# Patient Record
Sex: MALE | Race: WHITE | NOT HISPANIC OR LATINO | Employment: FULL TIME | URBAN - METROPOLITAN AREA
[De-identification: names, ages, dates, MRNs, and addresses within clinical notes are randomized per-mention and may not be internally consistent; named-entity substitution may affect disease eponyms.]

---

## 2019-09-16 ENCOUNTER — HISTORICAL (OUTPATIENT)
Dept: ADMINISTRATIVE | Facility: HOSPITAL | Age: 39
End: 2019-09-16

## 2019-09-16 LAB
ABS NEUT (OLG): 5.6 X10(3)/MCL (ref 2.1–9.2)
ALBUMIN SERPL-MCNC: 4.6 GM/DL (ref 3.4–5)
ALBUMIN/GLOB SERPL: 1.92 {RATIO} (ref 1.5–2.5)
ALP SERPL-CCNC: 64 UNIT/L (ref 38–126)
ALT SERPL-CCNC: 22 UNIT/L (ref 7–52)
APPEARANCE, UA: CLEAR
AST SERPL-CCNC: 23 UNIT/L (ref 15–37)
BACTERIA #/AREA URNS AUTO: NORMAL /HPF
BILIRUB SERPL-MCNC: 0.6 MG/DL (ref 0.2–1)
BILIRUB UR QL STRIP: NEGATIVE MG/DL
BILIRUBIN DIRECT+TOT PNL SERPL-MCNC: 0.1 MG/DL (ref 0–0.5)
BILIRUBIN DIRECT+TOT PNL SERPL-MCNC: 0.5 MG/DL
BUN SERPL-MCNC: 15 MG/DL (ref 7–18)
CALCIUM SERPL-MCNC: 9.6 MG/DL (ref 8.5–10)
CHLORIDE SERPL-SCNC: 102 MMOL/L (ref 98–107)
CHOLEST SERPL-MCNC: 189 MG/DL (ref 0–200)
CHOLEST/HDLC SERPL: 3.6 {RATIO}
CO2 SERPL-SCNC: 25 MMOL/L (ref 21–32)
COLOR UR: YELLOW
CREAT SERPL-MCNC: 0.97 MG/DL (ref 0.6–1.3)
ERYTHROCYTE [DISTWIDTH] IN BLOOD BY AUTOMATED COUNT: 12.5 % (ref 11.5–17)
GLOBULIN SER-MCNC: 2.4 GM/DL (ref 1.2–3)
GLUCOSE (UA): NEGATIVE MG/DL
GLUCOSE SERPL-MCNC: 105 MG/DL (ref 74–106)
HCT VFR BLD AUTO: 42.2 % (ref 42–52)
HDLC SERPL-MCNC: 53 MG/DL (ref 35–60)
HGB BLD-MCNC: 14.5 GM/DL (ref 14–18)
HGB UR QL STRIP: NEGATIVE UNIT/L
KETONES UR QL STRIP: NEGATIVE MG/DL
LDLC SERPL CALC-MCNC: 126 MG/DL (ref 0–129)
LEUKOCYTE ESTERASE UR QL STRIP: NEGATIVE UNIT/L
LYMPHOCYTES # BLD AUTO: 3.2 X10(3)/MCL (ref 0.6–3.4)
LYMPHOCYTES NFR BLD AUTO: 33 % (ref 13–40)
MCH RBC QN AUTO: 30.5 PG (ref 27–31.2)
MCHC RBC AUTO-ENTMCNC: 34 GM/DL (ref 32–36)
MCV RBC AUTO: 89 FL (ref 80–94)
MONOCYTES # BLD AUTO: 0.9 X10(3)/MCL (ref 0.1–1.3)
MONOCYTES NFR BLD AUTO: 9.1 % (ref 0.1–24)
NEUTROPHILS NFR BLD AUTO: 57.9 % (ref 47–80)
NITRITE UR QL STRIP.AUTO: NEGATIVE
PH UR STRIP: 6.5 [PH]
PLATELET # BLD AUTO: 285 X10(3)/MCL (ref 130–400)
PMV BLD AUTO: 10.4 FL (ref 9.4–12.4)
POTASSIUM SERPL-SCNC: 4.6 MMOL/L (ref 3.5–5.1)
PROT SERPL-MCNC: 7 GM/DL (ref 6.4–8.2)
PROT UR QL STRIP: NEGATIVE MG/DL
RBC # BLD AUTO: 4.75 X10(6)/MCL (ref 4.7–6.1)
RBC #/AREA URNS HPF: NORMAL /HPF
SODIUM SERPL-SCNC: 138 MMOL/L (ref 136–145)
SP GR UR STRIP: 1.02
SQUAMOUS EPITHELIAL, UA: NORMAL /LPF
TRIGL SERPL-MCNC: 211 MG/DL (ref 30–150)
UROBILINOGEN UR STRIP-ACNC: 1 MG/DL
VLDLC SERPL CALC-MCNC: 42.2 MG/DL
WBC # SPEC AUTO: 9.7 X10(3)/MCL (ref 4.5–11.5)
WBC #/AREA URNS AUTO: NORMAL /[HPF]

## 2020-05-05 ENCOUNTER — HISTORICAL (OUTPATIENT)
Dept: LAB | Facility: HOSPITAL | Age: 40
End: 2020-05-05

## 2020-09-22 ENCOUNTER — HISTORICAL (OUTPATIENT)
Dept: ADMINISTRATIVE | Facility: HOSPITAL | Age: 40
End: 2020-09-22

## 2020-09-22 LAB
ABS NEUT (OLG): 5.9 X10(3)/MCL (ref 2.1–9.2)
ALBUMIN SERPL-MCNC: 4.6 GM/DL (ref 3.4–5)
ALBUMIN/GLOB SERPL: 2.09 {RATIO} (ref 1.5–2.5)
ALP SERPL-CCNC: 66 UNIT/L (ref 38–126)
ALT SERPL-CCNC: 24 UNIT/L (ref 7–52)
APPEARANCE, UA: CLEAR
AST SERPL-CCNC: 17 UNIT/L (ref 15–37)
BACTERIA #/AREA URNS AUTO: NORMAL /HPF
BILIRUB SERPL-MCNC: 1 MG/DL (ref 0.2–1)
BILIRUB UR QL STRIP: NEGATIVE MG/DL
BILIRUBIN DIRECT+TOT PNL SERPL-MCNC: 0.2 MG/DL (ref 0–0.5)
BILIRUBIN DIRECT+TOT PNL SERPL-MCNC: 0.8 MG/DL
BUN SERPL-MCNC: 16 MG/DL (ref 7–18)
CALCIUM SERPL-MCNC: 9.8 MG/DL (ref 8.5–10.1)
CHLORIDE SERPL-SCNC: 103 MMOL/L (ref 98–107)
CHOLEST SERPL-MCNC: 190 MG/DL (ref 0–200)
CHOLEST/HDLC SERPL: 3.6 {RATIO}
CO2 SERPL-SCNC: 27 MMOL/L (ref 21–32)
COLOR UR: YELLOW
CREAT SERPL-MCNC: 1.13 MG/DL (ref 0.6–1.3)
ERYTHROCYTE [DISTWIDTH] IN BLOOD BY AUTOMATED COUNT: 13.1 % (ref 11.5–17)
GLOBULIN SER-MCNC: 2.2 GM/DL (ref 1.2–3)
GLUCOSE (UA): NEGATIVE MG/DL
GLUCOSE SERPL-MCNC: 80 MG/DL (ref 74–106)
HCT VFR BLD AUTO: 45.1 % (ref 42–52)
HDLC SERPL-MCNC: 53 MG/DL (ref 35–60)
HGB BLD-MCNC: 15.2 GM/DL (ref 14–18)
HGB UR QL STRIP: NEGATIVE UNIT/L
KETONES UR QL STRIP: NEGATIVE MG/DL
LDLC SERPL CALC-MCNC: 131 MG/DL (ref 0–129)
LEUKOCYTE ESTERASE UR QL STRIP: NEGATIVE UNIT/L
LYMPHOCYTES # BLD AUTO: 3.3 X10(3)/MCL (ref 0.6–3.4)
LYMPHOCYTES NFR BLD AUTO: 33.9 % (ref 13–40)
MCH RBC QN AUTO: 30.5 PG (ref 27–31.2)
MCHC RBC AUTO-ENTMCNC: 34 GM/DL (ref 32–36)
MCV RBC AUTO: 91 FL (ref 80–94)
MONOCYTES # BLD AUTO: 0.5 X10(3)/MCL (ref 0.1–1.3)
MONOCYTES NFR BLD AUTO: 5.5 % (ref 0.1–24)
NEUTROPHILS NFR BLD AUTO: 60.6 % (ref 47–80)
NITRITE UR QL STRIP.AUTO: NEGATIVE
PH UR STRIP: 6 [PH]
PLATELET # BLD AUTO: 326 X10(3)/MCL (ref 130–400)
PMV BLD AUTO: 10.1 FL (ref 9.4–12.4)
POTASSIUM SERPL-SCNC: 4.5 MMOL/L (ref 3.5–5.1)
PROT SERPL-MCNC: 6.8 GM/DL (ref 6.4–8.2)
PROT UR QL STRIP: NEGATIVE MG/DL
PSA SERPL-MCNC: 0.55 NG/ML (ref 0–2.5)
RBC # BLD AUTO: 4.98 X10(6)/MCL (ref 4.7–6.1)
RBC #/AREA URNS HPF: NORMAL /HPF
SODIUM SERPL-SCNC: 139 MMOL/L (ref 136–145)
SP GR UR STRIP: 1.02
SQUAMOUS EPITHELIAL, UA: NORMAL /LPF
TRIGL SERPL-MCNC: 133 MG/DL (ref 30–150)
UROBILINOGEN UR STRIP-ACNC: 0.2 MG/DL
VLDLC SERPL CALC-MCNC: 26.6 MG/DL
WBC # SPEC AUTO: 9.7 X10(3)/MCL (ref 4.5–11.5)
WBC #/AREA URNS AUTO: NORMAL /[HPF]

## 2020-10-29 ENCOUNTER — HISTORICAL (OUTPATIENT)
Dept: RADIOLOGY | Facility: HOSPITAL | Age: 40
End: 2020-10-29

## 2021-09-29 ENCOUNTER — HISTORICAL (OUTPATIENT)
Dept: ADMINISTRATIVE | Facility: HOSPITAL | Age: 41
End: 2021-09-29

## 2021-09-29 LAB
ABS NEUT (OLG): 6.1 X10(3)/MCL (ref 2.1–9.2)
ALBUMIN SERPL-MCNC: 4.8 GM/DL (ref 3.4–5)
ALBUMIN/GLOB SERPL: 2 {RATIO} (ref 1.5–2.5)
ALP SERPL-CCNC: 81 UNIT/L (ref 38–126)
ALT SERPL-CCNC: 17 UNIT/L (ref 7–52)
APPEARANCE, UA: CLEAR
AST SERPL-CCNC: 15 UNIT/L (ref 15–37)
BACTERIA #/AREA URNS AUTO: NORMAL /HPF
BILIRUB SERPL-MCNC: 1 MG/DL (ref 0.2–1)
BILIRUB UR QL STRIP: NEGATIVE MG/DL
BILIRUBIN DIRECT+TOT PNL SERPL-MCNC: 0.2 MG/DL (ref 0–0.5)
BILIRUBIN DIRECT+TOT PNL SERPL-MCNC: 0.8 MG/DL
BUN SERPL-MCNC: 14 MG/DL (ref 7–18)
CALCIUM SERPL-MCNC: 10 MG/DL (ref 8.5–10.1)
CHLORIDE SERPL-SCNC: 101 MMOL/L (ref 98–107)
CHOLEST SERPL-MCNC: 204 MG/DL (ref 0–200)
CHOLEST/HDLC SERPL: 4.4 {RATIO}
CO2 SERPL-SCNC: 30 MMOL/L (ref 21–32)
COLOR UR: YELLOW
CREAT SERPL-MCNC: 1.09 MG/DL (ref 0.6–1.3)
ERYTHROCYTE [DISTWIDTH] IN BLOOD BY AUTOMATED COUNT: 12.2 % (ref 11.5–17)
GLOBULIN SER-MCNC: 2.4 GM/DL (ref 1.2–3)
GLUCOSE (UA): NEGATIVE MG/DL
GLUCOSE SERPL-MCNC: 91 MG/DL (ref 74–106)
HCT VFR BLD AUTO: 44 % (ref 42–52)
HDLC SERPL-MCNC: 46 MG/DL (ref 35–60)
HGB BLD-MCNC: 14.9 GM/DL (ref 14–18)
HGB UR QL STRIP: NEGATIVE UNIT/L
KETONES UR QL STRIP: NEGATIVE MG/DL
LDLC SERPL CALC-MCNC: 117 MG/DL (ref 0–129)
LEUKOCYTE ESTERASE UR QL STRIP: NEGATIVE UNIT/L
LYMPHOCYTES # BLD AUTO: 2.8 X10(3)/MCL (ref 0.6–3.4)
LYMPHOCYTES NFR BLD AUTO: 29.6 % (ref 13–40)
MCH RBC QN AUTO: 30.6 PG (ref 27–31.2)
MCHC RBC AUTO-ENTMCNC: 34 GM/DL (ref 32–36)
MCV RBC AUTO: 90 FL (ref 80–94)
MONOCYTES # BLD AUTO: 0.5 X10(3)/MCL (ref 0.1–1.3)
MONOCYTES NFR BLD AUTO: 5.6 % (ref 0.1–24)
NEUTROPHILS NFR BLD AUTO: 64.8 % (ref 47–80)
NITRITE UR QL STRIP.AUTO: NEGATIVE
PH UR STRIP: 7 [PH]
PLATELET # BLD AUTO: 334 X10(3)/MCL (ref 130–400)
PMV BLD AUTO: 10.1 FL (ref 9.4–12.4)
POTASSIUM SERPL-SCNC: 4.3 MMOL/L (ref 3.5–5.1)
PROT SERPL-MCNC: 7.2 GM/DL (ref 6.4–8.2)
PROT UR QL STRIP: NEGATIVE MG/DL
PSA SERPL-MCNC: 0.61 NG/ML (ref 0–2.5)
RBC # BLD AUTO: 4.87 X10(6)/MCL (ref 4.7–6.1)
RBC #/AREA URNS HPF: NORMAL /HPF
SODIUM SERPL-SCNC: 140 MMOL/L (ref 136–145)
SP GR UR STRIP: 1.02
SQUAMOUS EPITHELIAL, UA: NORMAL /LPF
TRIGL SERPL-MCNC: 240 MG/DL (ref 30–150)
UROBILINOGEN UR STRIP-ACNC: 0.2 MG/DL
VLDLC SERPL CALC-MCNC: 48 MG/DL
WBC # SPEC AUTO: 9.4 X10(3)/MCL (ref 4.5–11.5)
WBC #/AREA URNS AUTO: NORMAL /[HPF]

## 2021-12-31 LAB
INFLUENZA A ANTIGEN, POC: NEGATIVE
INFLUENZA B ANTIGEN, POC: NEGATIVE
SARS-COV-2 RNA RESP QL NAA+PROBE: NEGATIVE

## 2022-04-12 ENCOUNTER — HISTORICAL (OUTPATIENT)
Dept: ADMINISTRATIVE | Facility: HOSPITAL | Age: 42
End: 2022-04-12

## 2022-04-30 VITALS
BODY MASS INDEX: 32.87 KG/M2 | DIASTOLIC BLOOD PRESSURE: 95 MMHG | OXYGEN SATURATION: 96 % | HEIGHT: 67 IN | WEIGHT: 209.44 LBS | SYSTOLIC BLOOD PRESSURE: 136 MMHG

## 2022-09-22 ENCOUNTER — HISTORICAL (OUTPATIENT)
Dept: ADMINISTRATIVE | Facility: HOSPITAL | Age: 42
End: 2022-09-22

## 2022-09-29 PROBLEM — Z00.00 ENCOUNTER FOR WELLNESS EXAMINATION IN ADULT: Status: ACTIVE | Noted: 2022-09-29

## 2022-09-29 PROBLEM — Z23 IMMUNIZATION DUE: Status: ACTIVE | Noted: 2022-09-29

## 2022-09-29 PROBLEM — Z12.5 PROSTATE CANCER SCREENING: Status: ACTIVE | Noted: 2022-09-29

## 2022-09-29 PROBLEM — I10 HYPERTENSION: Status: ACTIVE | Noted: 2022-09-29

## 2022-09-29 PROBLEM — I10 PRIMARY HYPERTENSION: Status: RESOLVED | Noted: 2022-09-29 | Resolved: 2022-09-29

## 2022-09-29 PROBLEM — I10 PRIMARY HYPERTENSION: Status: ACTIVE | Noted: 2022-09-29

## 2022-09-29 PROBLEM — M54.12 CERVICAL RADICULOPATHY: Status: ACTIVE | Noted: 2022-09-29

## 2022-11-01 ENCOUNTER — PATIENT MESSAGE (OUTPATIENT)
Dept: ADMINISTRATIVE | Facility: OTHER | Age: 42
End: 2022-11-01
Payer: COMMERCIAL

## 2022-11-02 ENCOUNTER — OFFICE VISIT (OUTPATIENT)
Dept: URGENT CARE | Facility: CLINIC | Age: 42
End: 2022-11-02
Payer: COMMERCIAL

## 2022-11-02 VITALS
WEIGHT: 205 LBS | HEIGHT: 67 IN | RESPIRATION RATE: 18 BRPM | TEMPERATURE: 99 F | DIASTOLIC BLOOD PRESSURE: 87 MMHG | OXYGEN SATURATION: 99 % | BODY MASS INDEX: 32.18 KG/M2 | HEART RATE: 65 BPM | SYSTOLIC BLOOD PRESSURE: 132 MMHG

## 2022-11-02 DIAGNOSIS — M54.41 RIGHT-SIDED LOW BACK PAIN WITH RIGHT-SIDED SCIATICA, UNSPECIFIED CHRONICITY: Primary | ICD-10-CM

## 2022-11-02 PROCEDURE — 1160F RVW MEDS BY RX/DR IN RCRD: CPT | Mod: CPTII,,,

## 2022-11-02 PROCEDURE — 3075F PR MOST RECENT SYSTOLIC BLOOD PRESS GE 130-139MM HG: ICD-10-PCS | Mod: CPTII,,,

## 2022-11-02 PROCEDURE — 99213 OFFICE O/P EST LOW 20 MIN: CPT | Mod: 25,,,

## 2022-11-02 PROCEDURE — 3075F SYST BP GE 130 - 139MM HG: CPT | Mod: CPTII,,,

## 2022-11-02 PROCEDURE — 1160F PR REVIEW ALL MEDS BY PRESCRIBER/CLIN PHARMACIST DOCUMENTED: ICD-10-PCS | Mod: CPTII,,,

## 2022-11-02 PROCEDURE — 1159F MED LIST DOCD IN RCRD: CPT | Mod: CPTII,,,

## 2022-11-02 PROCEDURE — 1159F PR MEDICATION LIST DOCUMENTED IN MEDICAL RECORD: ICD-10-PCS | Mod: CPTII,,,

## 2022-11-02 PROCEDURE — 96372 PR INJECTION,THERAP/PROPH/DIAG2ST, IM OR SUBCUT: ICD-10-PCS | Mod: ,,,

## 2022-11-02 PROCEDURE — 4010F ACE/ARB THERAPY RXD/TAKEN: CPT | Mod: CPTII,,,

## 2022-11-02 PROCEDURE — 4010F PR ACE/ARB THEARPY RXD/TAKEN: ICD-10-PCS | Mod: CPTII,,,

## 2022-11-02 PROCEDURE — 3079F PR MOST RECENT DIASTOLIC BLOOD PRESSURE 80-89 MM HG: ICD-10-PCS | Mod: CPTII,,,

## 2022-11-02 PROCEDURE — 3008F BODY MASS INDEX DOCD: CPT | Mod: CPTII,,,

## 2022-11-02 PROCEDURE — 96372 THER/PROPH/DIAG INJ SC/IM: CPT | Mod: ,,,

## 2022-11-02 PROCEDURE — 3008F PR BODY MASS INDEX (BMI) DOCUMENTED: ICD-10-PCS | Mod: CPTII,,,

## 2022-11-02 PROCEDURE — 99213 PR OFFICE/OUTPT VISIT, EST, LEVL III, 20-29 MIN: ICD-10-PCS | Mod: 25,,,

## 2022-11-02 PROCEDURE — 3079F DIAST BP 80-89 MM HG: CPT | Mod: CPTII,,,

## 2022-11-02 RX ORDER — BETAMETHASONE SODIUM PHOSPHATE AND BETAMETHASONE ACETATE 3; 3 MG/ML; MG/ML
9 INJECTION, SUSPENSION INTRA-ARTICULAR; INTRALESIONAL; INTRAMUSCULAR; SOFT TISSUE
Status: COMPLETED | OUTPATIENT
Start: 2022-11-02 | End: 2022-11-02

## 2022-11-02 RX ORDER — CYCLOBENZAPRINE HCL 10 MG
10 TABLET ORAL 3 TIMES DAILY PRN
Qty: 15 TABLET | Refills: 0 | Status: SHIPPED | OUTPATIENT
Start: 2022-11-02 | End: 2022-11-12

## 2022-11-02 RX ORDER — NABUMETONE 500 MG/1
500 TABLET, FILM COATED ORAL 2 TIMES DAILY PRN
Qty: 20 TABLET | Refills: 0 | Status: SHIPPED | OUTPATIENT
Start: 2022-11-02 | End: 2022-11-12

## 2022-11-02 RX ADMIN — BETAMETHASONE SODIUM PHOSPHATE AND BETAMETHASONE ACETATE 9 MG: 3; 3 INJECTION, SUSPENSION INTRA-ARTICULAR; INTRALESIONAL; INTRAMUSCULAR; SOFT TISSUE at 01:11

## 2022-11-02 NOTE — PROGRESS NOTES
"Subjective:       Patient ID: Carla Arias is a 42 y.o. male.    Vitals:  height is 5' 7" (1.702 m) and weight is 93 kg (205 lb). His temperature is 98.5 °F (36.9 °C). His blood pressure is 132/87 and his pulse is 65. His respiration is 18 and oxygen saturation is 99%.     Chief Complaint: Back Pain (Need cortisone shot if possible. - Entered by patient)    Patient is a 42-year-old male who presents to clinic with complaints of acute on chronic right-sided lower back pain which began yesterday when bending down to put a harness on a dog.  Pt denies recent injury.  Patient reports history of chronic right lower back pain with sciatica, after an accident.  Has been evaluated, had lumbar spinal injections in the past and is awaiting ablation surgery.  Denies loss of bowel or bladder function.  She reports pain usually radiates to the right leg but is currently in the right lower back and right hip.  Denies change in his usual numbness tingling paresthesias.  ROS    Objective:      Physical Exam      Assessment:       1. Right-sided low back pain with right-sided sciatica, unspecified chronicity          Plan:         Right-sided low back pain with right-sided sciatica, unspecified chronicity  -     betamethasone acetate-betamethasone sodium phosphate injection 9 mg  -     cyclobenzaprine (FLEXERIL) 10 MG tablet; Take 1 tablet (10 mg total) by mouth 3 (three) times daily as needed for Muscle spasms.  Dispense: 15 tablet; Refill: 0  -     nabumetone (RELAFEN) 500 MG tablet; Take 1 tablet (500 mg total) by mouth 2 (two) times daily as needed for Pain.  Dispense: 20 tablet; Refill: 0       Take medications only as prescribed as they may cause sedation (safety precautions given).   Drink plenty of fluids and get plenty of rest.  Take medication with food.   Do not take over-the-counter anti-inflammatories while taking prescription anti-inflammatories.  Lower back stretches daily.  Avoid strenuous lifting, use proper body " mechanics.  Apply heating pad, Ice pack, Biofreeze or Epsom salt baths as needed.  Encourage exercise to strengthen core muscles to support your back.  Follow-up with your primary care doctor as needed.   Present to the Emergency Department with any significant change or worsening symptoms.           During injection given, discussed use of muscle relaxer and prescription strength anti-inflammatory.

## 2022-11-02 NOTE — PATIENT INSTRUCTIONS
Problem: RESPIRATORY - ADULT  Goal: Achieves optimal ventilation and oxygenation  INTERVENTIONS:  - Assess for changes in respiratory status  - Assess for changes in mentation and behavior  - Position to facilitate oxygenation and minimize respiratory effo Take medications only as prescribed as they may cause sedation (safety precautions given).   Drink plenty of fluids and get plenty of rest.  Do not take over-the-counter anti-inflammatories while taking prescription anti-inflammatories.  Take medication with food.   Lower back stretches daily.  Avoid strenuous lifting, use proper body mechanics.  Apply heating pad, Ice pack, Biofreeze or Epsom salt baths as needed.  Encourage exercise to strengthen core muscles to support your back.  Follow-up with your primary care doctor as needed.   Present to the Emergency Department with any significant change or worsening symptoms.

## 2023-01-02 PROBLEM — Z00.00 ENCOUNTER FOR WELLNESS EXAMINATION IN ADULT: Status: RESOLVED | Noted: 2022-09-29 | Resolved: 2023-01-02

## 2023-02-06 ENCOUNTER — OFFICE VISIT (OUTPATIENT)
Dept: URGENT CARE | Facility: CLINIC | Age: 43
End: 2023-02-06
Payer: COMMERCIAL

## 2023-02-06 VITALS
WEIGHT: 205 LBS | OXYGEN SATURATION: 97 % | HEART RATE: 104 BPM | DIASTOLIC BLOOD PRESSURE: 83 MMHG | TEMPERATURE: 98 F | HEIGHT: 67 IN | SYSTOLIC BLOOD PRESSURE: 116 MMHG | BODY MASS INDEX: 32.18 KG/M2 | RESPIRATION RATE: 18 BRPM

## 2023-02-06 DIAGNOSIS — M79.672 LEFT FOOT PAIN: Primary | ICD-10-CM

## 2023-02-06 PROCEDURE — 3079F DIAST BP 80-89 MM HG: CPT | Mod: CPTII,,, | Performed by: FAMILY MEDICINE

## 2023-02-06 PROCEDURE — 1160F PR REVIEW ALL MEDS BY PRESCRIBER/CLIN PHARMACIST DOCUMENTED: ICD-10-PCS | Mod: CPTII,,, | Performed by: FAMILY MEDICINE

## 2023-02-06 PROCEDURE — 96372 PR INJECTION,THERAP/PROPH/DIAG2ST, IM OR SUBCUT: ICD-10-PCS | Mod: ,,, | Performed by: FAMILY MEDICINE

## 2023-02-06 PROCEDURE — 99213 OFFICE O/P EST LOW 20 MIN: CPT | Mod: 25,,, | Performed by: FAMILY MEDICINE

## 2023-02-06 PROCEDURE — 3074F SYST BP LT 130 MM HG: CPT | Mod: CPTII,,, | Performed by: FAMILY MEDICINE

## 2023-02-06 PROCEDURE — 3008F BODY MASS INDEX DOCD: CPT | Mod: CPTII,,, | Performed by: FAMILY MEDICINE

## 2023-02-06 PROCEDURE — 4010F PR ACE/ARB THEARPY RXD/TAKEN: ICD-10-PCS | Mod: CPTII,,, | Performed by: FAMILY MEDICINE

## 2023-02-06 PROCEDURE — 4010F ACE/ARB THERAPY RXD/TAKEN: CPT | Mod: CPTII,,, | Performed by: FAMILY MEDICINE

## 2023-02-06 PROCEDURE — 1160F RVW MEDS BY RX/DR IN RCRD: CPT | Mod: CPTII,,, | Performed by: FAMILY MEDICINE

## 2023-02-06 PROCEDURE — 96372 THER/PROPH/DIAG INJ SC/IM: CPT | Mod: ,,, | Performed by: FAMILY MEDICINE

## 2023-02-06 PROCEDURE — 3008F PR BODY MASS INDEX (BMI) DOCUMENTED: ICD-10-PCS | Mod: CPTII,,, | Performed by: FAMILY MEDICINE

## 2023-02-06 PROCEDURE — 99213 PR OFFICE/OUTPT VISIT, EST, LEVL III, 20-29 MIN: ICD-10-PCS | Mod: 25,,, | Performed by: FAMILY MEDICINE

## 2023-02-06 PROCEDURE — 1159F PR MEDICATION LIST DOCUMENTED IN MEDICAL RECORD: ICD-10-PCS | Mod: CPTII,,, | Performed by: FAMILY MEDICINE

## 2023-02-06 PROCEDURE — 1159F MED LIST DOCD IN RCRD: CPT | Mod: CPTII,,, | Performed by: FAMILY MEDICINE

## 2023-02-06 PROCEDURE — 3079F PR MOST RECENT DIASTOLIC BLOOD PRESSURE 80-89 MM HG: ICD-10-PCS | Mod: CPTII,,, | Performed by: FAMILY MEDICINE

## 2023-02-06 PROCEDURE — 3074F PR MOST RECENT SYSTOLIC BLOOD PRESSURE < 130 MM HG: ICD-10-PCS | Mod: CPTII,,, | Performed by: FAMILY MEDICINE

## 2023-02-06 RX ORDER — KETOROLAC TROMETHAMINE 10 MG/1
10 TABLET, FILM COATED ORAL EVERY 6 HOURS
Qty: 20 TABLET | Refills: 0 | Status: SHIPPED | OUTPATIENT
Start: 2023-02-06 | End: 2023-02-11

## 2023-02-06 RX ORDER — KETOROLAC TROMETHAMINE 30 MG/ML
30 INJECTION, SOLUTION INTRAMUSCULAR; INTRAVENOUS
Status: COMPLETED | OUTPATIENT
Start: 2023-02-06 | End: 2023-02-06

## 2023-02-06 RX ADMIN — KETOROLAC TROMETHAMINE 30 MG: 30 INJECTION, SOLUTION INTRAMUSCULAR; INTRAVENOUS at 01:02

## 2023-02-06 NOTE — PROGRESS NOTES
"Subjective:       Patient ID: Carla Arias is a 43 y.o. male.    Vitals:  height is 5' 7" (1.702 m) and weight is 93 kg (205 lb). His respiration is 18.     Chief Complaint: Foot Swelling    43 y.o. male presents to clinic w/ c/o L foot pain that worsens w/ weightbearing x1d. Reports approx. 4 episodes similar to today's complaint over the past year. No alleviating factors used. Denies known injury or trauma, redness, warmth.  ROS    Objective:      Physical Exam      Assessment:       No diagnosis found.      Plan:         There are no diagnoses linked to this encounter.                 "

## 2023-02-06 NOTE — PROGRESS NOTES
Patient ID: [id number]     Chief Complaint: upper respiratory tract infection symptoms    History of Present Illness:     Carla Arias is a 43 y.o. male  who presents today for symptoms of Foot Swelling  43 y.o. male presents to clinic w/ c/o L foot pain that worsens w/ weightbearing x1d. Reports approx. 4 episodes similar to today's complaint over the past year. No alleviating factors used. Denies known injury or trauma, redness, warmth.     Prior episodes have occurred in both the left and right feet, always at the same location.  Pain all we starts in the morning, and by the next day is mostly resolved.  No change in medications, no change in physical activity, no change in diet.  Family history of gout.  Primary care provider saw him 1 time for this incident and remarked that it could be gout, but no workup.  He is here today to get a workup done during the acute phase.  He drank a little bit of alcohol on the night before this started, but he has consumed alcohol in greater quantities without having this condition resolved.  He also reports some allodynia over the left foot in the morning when the sheets brush against his foot.    Pt denies experiencing any fevers, chills, nausea, vomiting, difficulty breathing, dysphagia, or neck stiffness.    Past Medical History:     ----------------------------  Chronic back pain  HTN (hypertension)  Neck pain     Past Surgical History:   Procedure Laterality Date    CERVICAL FUSION  06/17/2021    anterior    CLOSED REDUCTION AND PERCUTANEOUS PINNING (CRPP) OF WRIST Left 2009    TONSILLECTOMY AND ADENOIDECTOMY      WISDOM TOOTH EXTRACTION  1998       Review of patient's allergies indicates:  No Known Allergies    Outpatient Medications Marked as Taking for the 2/6/23 encounter (Office Visit) with Kevin Ruiz MD   Medication Sig Dispense Refill    lisinopriL-hydrochlorothiazide (PRINZIDE,ZESTORETIC) 20-12.5 mg per tablet Take 1 tablet by mouth once daily. 30 tablet  "3     Current Facility-Administered Medications for the 2/6/23 encounter (Office Visit) with Kevin Ruiz MD   Medication Dose Route Frequency Provider Last Rate Last Admin    ketorolac injection 30 mg  30 mg Intramuscular 1 time in Clinic/HOD Kevin Ruiz MD           Social History     Socioeconomic History    Marital status:     Number of children: 2   Occupational History    Occupation:  [employer]   Tobacco Use    Smoking status: Never    Smokeless tobacco: Never   Substance and Sexual Activity    Alcohol use: Yes     Comment: Occasional    Drug use: Never    Sexual activity: Not Currently        Family History   Problem Relation Age of Onset    Hypertension Mother     Hypertension Father     Gout Father     Hypertension Sister     No Known Problems Brother         Subjective:     Review of Systems   Constitutional:  Negative for chills, fever and malaise/fatigue.   Eyes:  Negative for pain and redness.   Respiratory:  Negative for shortness of breath.    Cardiovascular:  Negative for chest pain.   Gastrointestinal:  Negative for abdominal pain, diarrhea, nausea and vomiting.   Musculoskeletal:  Negative for joint pain and myalgias.   Skin:  Negative for itching and rash.   Neurological:  Negative for weakness.     Objective:     /83   Pulse 104   Temp 97.8 °F (36.6 °C)   Resp 18   Ht 5' 7" (1.702 m)   Wt 93 kg (205 lb)   SpO2 97%   BMI 32.11 kg/m²     Physical Exam  Constitutional:       General: He is not in acute distress.     Appearance: Normal appearance. He is not ill-appearing or toxic-appearing.   HENT:      Head: Normocephalic and atraumatic.   Cardiovascular:      Rate and Rhythm: Normal rate and regular rhythm.   Pulmonary:      Effort: Pulmonary effort is normal. No respiratory distress.      Breath sounds: Normal breath sounds.   Abdominal:      Palpations: There is no mass.      Tenderness: There is no abdominal tenderness. There is no guarding or rebound. "   Musculoskeletal:         General: Tenderness present. No swelling, deformity or signs of injury.        Feet:    Feet:      Comments: There is exquisite tenderness 3 cm superior to the 5th proximal metatarsal, atop the dorsal foot.  There is no redness, swelling, or warmth over the areas of tenderness.  No tenderness on the sole of the foot.  No popliteal or calf tenderness.  Cap refill is normal in the left lower extremity, there is no edema or redness anywhere on the leg.  DP pulse normal.  Casselberry ankle rules negative  Skin:     Findings: No bruising, erythema or rash.   Neurological:      General: No focal deficit present.      Mental Status: He is alert and oriented to person, place, and time. Mental status is at baseline.      Sensory: No sensory deficit.      Motor: No weakness.      Gait: Gait normal.      Deep Tendon Reflexes: Reflexes normal.   Psychiatric:         Mood and Affect: Mood normal.         Behavior: Behavior normal.       Assessment & Plan:       ICD-10-CM ICD-9-CM   1. Left foot pain  M79.672 729.5        1. Left foot pain  -     ketorolac injection 30 mg  -     ketorolac (TORADOL) 10 mg tablet; Take 1 tablet (10 mg total) by mouth every 6 (six) hours. for 5 days  Dispense: 20 tablet; Refill: 0  -     Uric Acid; Future; Expected date: 02/06/2023  -     X-Ray Foot 2 View Left; Future; Expected date: 02/06/2023         Discussed uncertain etiology of condition.  Differential includes gout versus osteoarthritis.  It is eye that he gets the same symptom in opposite feet at different times.  We will get uric acid lab today for his PC if he to review, though test is not sensitive or specific even during acute flares.  Also get x-ray today.  We will treat with ketorolac injection followed by Toradol tabs at home.  We will call him with the formal x-ray report, my reading does not show any obvious acute bony abnormalities.

## 2023-02-06 NOTE — PATIENT INSTRUCTIONS
Please begin taking Toradol tablets as needed for pain beginning 24 hours after your injection.    Please follow-up with your primary care physician and have him review the labs drawn here today.    Please go to the emergency room with any new and concerning symptoms.

## 2023-02-16 ENCOUNTER — LAB VISIT (OUTPATIENT)
Dept: LAB | Facility: HOSPITAL | Age: 43
End: 2023-02-16
Attending: EMERGENCY MEDICINE
Payer: COMMERCIAL

## 2023-02-16 DIAGNOSIS — I10 PRIMARY HYPERTENSION: ICD-10-CM

## 2023-02-16 LAB
ANION GAP SERPL CALC-SCNC: 12 MEQ/L
BUN SERPL-MCNC: 20 MG/DL (ref 8.9–20.6)
CALCIUM SERPL-MCNC: 9.5 MG/DL (ref 8.4–10.2)
CHLORIDE SERPL-SCNC: 103 MMOL/L (ref 98–107)
CO2 SERPL-SCNC: 23 MMOL/L (ref 22–29)
CREAT SERPL-MCNC: 1.24 MG/DL (ref 0.73–1.18)
CREAT/UREA NIT SERPL: 16
GFR SERPLBLD CREATININE-BSD FMLA CKD-EPI: >60 MLS/MIN/1.73/M2
GLUCOSE SERPL-MCNC: 111 MG/DL (ref 74–100)
POTASSIUM SERPL-SCNC: 3.7 MMOL/L (ref 3.5–5.1)
SODIUM SERPL-SCNC: 138 MMOL/L (ref 136–145)

## 2023-02-16 PROCEDURE — 80048 BASIC METABOLIC PNL TOTAL CA: CPT

## 2023-02-16 PROCEDURE — 36415 COLL VENOUS BLD VENIPUNCTURE: CPT

## 2023-03-02 ENCOUNTER — LAB VISIT (OUTPATIENT)
Dept: LAB | Facility: HOSPITAL | Age: 43
End: 2023-03-02
Attending: EMERGENCY MEDICINE
Payer: COMMERCIAL

## 2023-03-02 DIAGNOSIS — I10 PRIMARY HYPERTENSION: ICD-10-CM

## 2023-03-02 LAB
ANION GAP SERPL CALC-SCNC: 13 MEQ/L
BUN SERPL-MCNC: 15 MG/DL (ref 8.9–20.6)
CALCIUM SERPL-MCNC: 10.4 MG/DL (ref 8.4–10.2)
CHLORIDE SERPL-SCNC: 105 MMOL/L (ref 98–107)
CO2 SERPL-SCNC: 23 MMOL/L (ref 22–29)
CREAT SERPL-MCNC: 1.19 MG/DL (ref 0.73–1.18)
CREAT/UREA NIT SERPL: 13
GFR SERPLBLD CREATININE-BSD FMLA CKD-EPI: >60 MLS/MIN/1.73/M2
GLUCOSE SERPL-MCNC: 139 MG/DL (ref 74–100)
POTASSIUM SERPL-SCNC: 4.3 MMOL/L (ref 3.5–5.1)
SODIUM SERPL-SCNC: 141 MMOL/L (ref 136–145)

## 2023-03-02 PROCEDURE — 36415 COLL VENOUS BLD VENIPUNCTURE: CPT

## 2023-03-02 PROCEDURE — 80048 BASIC METABOLIC PNL TOTAL CA: CPT

## 2023-04-23 ENCOUNTER — PATIENT MESSAGE (OUTPATIENT)
Dept: ADMINISTRATIVE | Facility: OTHER | Age: 43
End: 2023-04-23
Payer: COMMERCIAL

## 2023-10-03 PROCEDURE — 86803 HEPATITIS C AB TEST: CPT | Performed by: FAMILY MEDICINE

## 2023-10-03 PROCEDURE — 87389 HIV-1 AG W/HIV-1&-2 AB AG IA: CPT | Performed by: FAMILY MEDICINE

## 2024-01-01 PROBLEM — Z00.00 ENCOUNTER FOR WELLNESS EXAMINATION IN ADULT: Status: RESOLVED | Noted: 2022-09-29 | Resolved: 2024-01-01

## 2024-01-10 ENCOUNTER — OFFICE VISIT (OUTPATIENT)
Dept: URGENT CARE | Facility: CLINIC | Age: 44
End: 2024-01-10
Payer: COMMERCIAL

## 2024-01-10 VITALS
HEART RATE: 85 BPM | BODY MASS INDEX: 30.92 KG/M2 | TEMPERATURE: 99 F | DIASTOLIC BLOOD PRESSURE: 88 MMHG | OXYGEN SATURATION: 98 % | WEIGHT: 197 LBS | RESPIRATION RATE: 18 BRPM | HEIGHT: 67 IN | SYSTOLIC BLOOD PRESSURE: 128 MMHG

## 2024-01-10 DIAGNOSIS — J06.9 ACUTE URI: Primary | ICD-10-CM

## 2024-01-10 DIAGNOSIS — R05.9 COUGH, UNSPECIFIED TYPE: ICD-10-CM

## 2024-01-10 LAB
CTP QC/QA: YES
CTP QC/QA: YES
POC MOLECULAR INFLUENZA A AGN: NEGATIVE
POC MOLECULAR INFLUENZA B AGN: NEGATIVE
SARS-COV-2 RDRP RESP QL NAA+PROBE: NEGATIVE

## 2024-01-10 PROCEDURE — 96372 THER/PROPH/DIAG INJ SC/IM: CPT | Mod: ,,, | Performed by: PHYSICIAN ASSISTANT

## 2024-01-10 PROCEDURE — 87502 INFLUENZA DNA AMP PROBE: CPT | Mod: QW,,, | Performed by: PHYSICIAN ASSISTANT

## 2024-01-10 PROCEDURE — 99213 OFFICE O/P EST LOW 20 MIN: CPT | Mod: 25,,, | Performed by: PHYSICIAN ASSISTANT

## 2024-01-10 PROCEDURE — 87635 SARS-COV-2 COVID-19 AMP PRB: CPT | Mod: QW,,, | Performed by: PHYSICIAN ASSISTANT

## 2024-01-10 RX ORDER — BETAMETHASONE SODIUM PHOSPHATE AND BETAMETHASONE ACETATE 3; 3 MG/ML; MG/ML
9 INJECTION, SUSPENSION INTRA-ARTICULAR; INTRALESIONAL; INTRAMUSCULAR; SOFT TISSUE
Status: COMPLETED | OUTPATIENT
Start: 2024-01-10 | End: 2024-01-10

## 2024-01-10 RX ADMIN — BETAMETHASONE SODIUM PHOSPHATE AND BETAMETHASONE ACETATE 9 MG: 3; 3 INJECTION, SUSPENSION INTRA-ARTICULAR; INTRALESIONAL; INTRAMUSCULAR; SOFT TISSUE at 05:01

## 2024-01-10 NOTE — PROGRESS NOTES
"Subjective:      Patient ID: Carla Arias is a 43 y.o. male.    Vitals:  height is 5' 7" (1.702 m) and weight is 89.4 kg (197 lb). His oral temperature is 99.4 °F (37.4 °C). His blood pressure is 128/88 and his pulse is 85. His respiration is 18 and oxygen saturation is 98%.     Chief Complaint: Cough (Sinuses as well - Entered by patient)     Patient is a 43 y.o. male who presents to urgent care with complaints of cough, congestion, post nasal drip x1 week. Alleviating factors include mucinex, flu and cold medication, inhaler with no relief. Patient denies fever, N/V/D.      Cough      Respiratory:  Positive for cough.       Objective:     Physical Exam   Constitutional: He is oriented to person, place, and time. He appears well-developed. He is cooperative.  Non-toxic appearance. He does not appear ill. No distress.   HENT:   Head: Normocephalic and atraumatic.   Ears:   Right Ear: Hearing, tympanic membrane, external ear and ear canal normal.   Left Ear: Hearing, tympanic membrane, external ear and ear canal normal.   Nose: Nose normal. No nasal deformity. No epistaxis.   Mouth/Throat: Uvula is midline, oropharynx is clear and moist and mucous membranes are normal. No trismus in the jaw. Normal dentition. No uvula swelling. No oropharyngeal exudate, posterior oropharyngeal edema or posterior oropharyngeal erythema.   Eyes: Conjunctivae and lids are normal. No scleral icterus.   Neck: Trachea normal and phonation normal. Neck supple. No edema present. No erythema present. No neck rigidity present.   Cardiovascular: Normal rate, regular rhythm, normal heart sounds and normal pulses.   Pulmonary/Chest: Effort normal and breath sounds normal. No respiratory distress. He has no decreased breath sounds. He has no rhonchi.   Abdominal: Normal appearance.   Musculoskeletal: Normal range of motion.         General: No deformity. Normal range of motion.   Neurological: He is alert and oriented to person, place, and time. " "He exhibits normal muscle tone. Coordination normal.   Skin: Skin is warm, dry, intact, not diaphoretic and not pale.   Psychiatric: His speech is normal and behavior is normal. Judgment and thought content normal.   Nursing note and vitals reviewed.       Previous History      Review of patient's allergies indicates:  No Known Allergies    Past Medical History:   Diagnosis Date    Chronic back pain     HTN (hypertension)     Neck pain      Current Outpatient Medications   Medication Instructions    albuterol (PROAIR HFA) 90 mcg/actuation inhaler 2 puffs, Inhalation, Every 6 hours PRN, Rescue    lisinopriL (PRINIVIL,ZESTRIL) 40 mg, Oral, Daily    pyrilamine-chlophedianoL 12.5-12.5 mg/5 mL Liqd 10 mLs, Oral, 3 times daily     Past Surgical History:   Procedure Laterality Date    CERVICAL FUSION  06/17/2021    anterior    CLOSED REDUCTION AND PERCUTANEOUS PINNING (CRPP) OF WRIST Left 2009    TONSILLECTOMY AND ADENOIDECTOMY      WISDOM TOOTH EXTRACTION  1998     Family History   Problem Relation Age of Onset    Hypertension Mother     Hypertension Father     Gout Father     No Known Problems Brother        Social History     Tobacco Use    Smoking status: Never    Smokeless tobacco: Never   Substance Use Topics    Alcohol use: Yes     Comment: Occasional    Drug use: Never        Physical Exam      Vital Signs Reviewed   /88   Pulse 85   Temp 99.4 °F (37.4 °C) (Oral)   Resp 18   Ht 5' 7" (1.702 m)   Wt 89.4 kg (197 lb)   SpO2 98%   BMI 30.85 kg/m²        Procedures    Procedures     Labs     Results for orders placed or performed in visit on 01/10/24   POCT Influenza A/B MOLECULAR   Result Value Ref Range    POC Molecular Influenza A Ag Negative Negative, Not Reported    POC Molecular Influenza B Ag Negative Negative, Not Reported     Acceptable Yes    POCT COVID-19 Rapid Screening   Result Value Ref Range    POC Rapid COVID Negative Negative     Acceptable Yes  "       Assessment:     1. Acute URI    2. Cough, unspecified type        Plan:       Acute URI    Cough, unspecified type  -     POCT Influenza A/B MOLECULAR  -     POCT COVID-19 Rapid Screening    Other orders  -     betamethasone acetate-betamethasone sodium phosphate injection 9 mg  -     pyrilamine-chlophedianoL 12.5-12.5 mg/5 mL Liqd; Take 10 mLs by mouth 3 (three) times daily.  Dispense: 180 mL; Refill: 0      Drink plenty of fluids.     Get plenty of rest.     Tylenol or Motrin as needed.     Go to the ER with any significant change or worsening of symptoms.     Follow up with your primary care doctor.

## 2024-07-13 ENCOUNTER — PATIENT MESSAGE (OUTPATIENT)
Dept: ADMINISTRATIVE | Facility: OTHER | Age: 44
End: 2024-07-13
Payer: COMMERCIAL

## 2024-09-09 ENCOUNTER — PATIENT MESSAGE (OUTPATIENT)
Dept: ADMINISTRATIVE | Facility: OTHER | Age: 44
End: 2024-09-09
Payer: COMMERCIAL

## 2024-09-30 ENCOUNTER — OFFICE VISIT (OUTPATIENT)
Dept: URGENT CARE | Facility: CLINIC | Age: 44
End: 2024-09-30
Payer: COMMERCIAL

## 2024-09-30 VITALS
OXYGEN SATURATION: 99 % | HEIGHT: 67 IN | WEIGHT: 175 LBS | RESPIRATION RATE: 18 BRPM | DIASTOLIC BLOOD PRESSURE: 90 MMHG | BODY MASS INDEX: 27.47 KG/M2 | HEART RATE: 78 BPM | TEMPERATURE: 98 F | SYSTOLIC BLOOD PRESSURE: 138 MMHG

## 2024-09-30 DIAGNOSIS — J02.9 SORE THROAT: Primary | ICD-10-CM

## 2024-09-30 DIAGNOSIS — R06.2 WHEEZING: ICD-10-CM

## 2024-09-30 DIAGNOSIS — R05.1 ACUTE COUGH: ICD-10-CM

## 2024-09-30 LAB
CTP QC/QA: YES
CTP QC/QA: YES
MOLECULAR STREP A: NEGATIVE
SARS-COV-2 AG RESP QL IA.RAPID: NEGATIVE

## 2024-09-30 PROCEDURE — 87811 SARS-COV-2 COVID19 W/OPTIC: CPT | Mod: QW,,, | Performed by: PHYSICIAN ASSISTANT

## 2024-09-30 PROCEDURE — 87651 STREP A DNA AMP PROBE: CPT | Mod: QW,,, | Performed by: PHYSICIAN ASSISTANT

## 2024-09-30 PROCEDURE — 96372 THER/PROPH/DIAG INJ SC/IM: CPT | Mod: ,,, | Performed by: PHYSICIAN ASSISTANT

## 2024-09-30 PROCEDURE — 99213 OFFICE O/P EST LOW 20 MIN: CPT | Mod: 25,,, | Performed by: PHYSICIAN ASSISTANT

## 2024-09-30 RX ORDER — PREDNISONE 10 MG/1
10 TABLET ORAL DAILY
Qty: 5 TABLET | Refills: 0 | Status: SHIPPED | OUTPATIENT
Start: 2024-09-30 | End: 2024-10-05

## 2024-09-30 RX ORDER — PROMETHAZINE HYDROCHLORIDE AND DEXTROMETHORPHAN HYDROBROMIDE 6.25; 15 MG/5ML; MG/5ML
5 SYRUP ORAL EVERY 8 HOURS PRN
Qty: 118 ML | Refills: 0 | Status: SHIPPED | OUTPATIENT
Start: 2024-09-30 | End: 2024-10-05

## 2024-09-30 RX ORDER — DEXAMETHASONE SODIUM PHOSPHATE 10 MG/ML
8 INJECTION INTRAMUSCULAR; INTRAVENOUS
Status: COMPLETED | OUTPATIENT
Start: 2024-09-30 | End: 2024-09-30

## 2024-09-30 RX ADMIN — DEXAMETHASONE SODIUM PHOSPHATE 8 MG: 10 INJECTION INTRAMUSCULAR; INTRAVENOUS at 02:09

## 2024-09-30 NOTE — PROGRESS NOTES
"Subjective:      Patient ID: Carla Arias is a 44 y.o. male.    Vitals:  height is 5' 7" (1.702 m) and weight is 79.4 kg (175 lb). His temperature is 97.7 °F (36.5 °C). His blood pressure is 138/90 (abnormal) and his pulse is 78. His respiration is 18 and oxygen saturation is 99%.     Chief Complaint: Wheezing    Male reports in the last 48-72 hours having acute URI symptoms cough cold congestion, postnasal drip, sore throat with flare-up of childhood asthma having wheezing using personal albuterol MDI 1 puff 2-3 times in the last 2 days.    Wheezing   This is a new problem. Associated symptoms include chills, coughing and a sore throat. Pertinent negatives include no diarrhea, ear pain, fever, headaches, neck pain, shortness of breath or vomiting.     Constitution: Positive for chills and fatigue. Negative for fever.   HENT:  Positive for congestion and sore throat. Negative for ear pain, sinus pain, trouble swallowing and voice change.    Neck: Negative for neck pain and neck stiffness.   Cardiovascular: Negative.    Respiratory:  Positive for chest tightness, cough, wheezing and asthma. Negative for shortness of breath.    Gastrointestinal:  Negative for vomiting and diarrhea.   Musculoskeletal:  Positive for muscle ache.   Skin: Negative.    Allergic/Immunologic: Negative.  Positive for asthma.   Neurological:  Negative for dizziness, passing out and headaches.      Objective:     Physical Exam   Constitutional: He is oriented to person, place, and time. He appears well-developed. He is cooperative.  Non-toxic appearance.      Comments:Awake alert pleasant ambulatory nasally congested male     HENT:   Head: Normocephalic.   Ears:   Right Ear: Hearing, tympanic membrane, external ear and ear canal normal.   Left Ear: Hearing, tympanic membrane, external ear and ear canal normal.   Nose: Congestion present. No mucosal edema, rhinorrhea or nasal deformity. No epistaxis. Right sinus exhibits no maxillary sinus " tenderness and no frontal sinus tenderness. Left sinus exhibits no maxillary sinus tenderness and no frontal sinus tenderness.   Mouth/Throat: Uvula is midline, oropharynx is clear and moist and mucous membranes are normal. Mucous membranes are moist. No trismus in the jaw. Normal dentition. No uvula swelling. No oropharyngeal exudate, posterior oropharyngeal edema or posterior oropharyngeal erythema.      Comments: No edema, no palate petechiae, no muffled voice  Eyes: Conjunctivae and lids are normal. No scleral icterus.   Neck: Trachea normal and phonation normal. Neck supple. No edema present. No erythema present. No neck rigidity present.   Cardiovascular: Normal rate, regular rhythm, normal heart sounds and normal pulses.   No murmur heard.Exam reveals no gallop.   Pulmonary/Chest: Effort normal. No respiratory distress. He has no decreased breath sounds. He has wheezes. He has no rhonchi. He has no rales.         Comments: Bilateral mild inspiratory and expiratory wheezes    Musculoskeletal: Normal range of motion.         General: No swelling. Normal range of motion.      Cervical back: He exhibits no tenderness.   Lymphadenopathy:     He has no cervical adenopathy.   Neurological: no focal deficit. He is alert and oriented to person, place, and time. He displays no weakness. He exhibits normal muscle tone. Coordination normal.   Skin: Skin is warm, dry, intact, not diaphoretic and not pale.   Psychiatric: His speech is normal and behavior is normal. Judgment and thought content normal.   Nursing note and vitals reviewed.         Previous History      Review of patient's allergies indicates:  No Known Allergies    Past Medical History:   Diagnosis Date    Chronic back pain     HTN (hypertension)     Neck pain      Current Outpatient Medications   Medication Instructions    albuterol (PROAIR HFA) 90 mcg/actuation inhaler 2 puffs, Inhalation, Every 6 hours PRN, Rescue    ALPRAZolam (XANAX) 0.5 MG tablet Take  "1-2 tabs po 1 hour prior to flight    lisinopriL (PRINIVIL,ZESTRIL) 40 mg, Oral, Daily    predniSONE (DELTASONE) 10 mg, Oral, Daily    promethazine-dextromethorphan (PROMETHAZINE-DM) 6.25-15 mg/5 mL Syrp 5 mLs, Oral, Every 8 hours PRN    pyrilamine-chlophedianoL 12.5-12.5 mg/5 mL Liqd 10 mLs, Oral, 3 times daily     Past Surgical History:   Procedure Laterality Date    CERVICAL FUSION  06/17/2021    anterior    CLOSED REDUCTION AND PERCUTANEOUS PINNING (CRPP) OF WRIST Left 2009    TONSILLECTOMY AND ADENOIDECTOMY      WISDOM TOOTH EXTRACTION  1998     Family History   Problem Relation Name Age of Onset    Hypertension Mother Rachael Arias     Hypertension Father Jez Arias     Gout Father Jez Arias     No Known Problems Brother         Social History     Tobacco Use    Smoking status: Never     Passive exposure: Never    Smokeless tobacco: Never   Substance Use Topics    Alcohol use: Not Currently    Drug use: Never        Physical Exam      Vital Signs Reviewed   BP (!) 138/90   Pulse 78   Temp 97.7 °F (36.5 °C)   Resp 18   Ht 5' 7" (1.702 m)   Wt 79.4 kg (175 lb)   SpO2 99%   BMI 27.41 kg/m²        Procedures    Procedures     Labs     Results for orders placed or performed in visit on 09/30/24   POCT Strep A, Molecular    Collection Time: 09/30/24  2:18 PM   Result Value Ref Range    Molecular Strep A, POC Negative Negative     Acceptable Yes    SARS Coronavirus 2 Antigen, POCT Manual Read    Collection Time: 09/30/24  2:22 PM   Result Value Ref Range    SARS Coronavirus 2 Antigen Negative Negative     Acceptable Yes        Assessment:     1. Sore throat    2. Acute cough    3. Wheezing        Plan:   Patient understands negative testing though high concern for viral respiratory illness and discharge plan with symptomatic Rx.  Patient reports having sufficient albuterol MDI and no current access to nebulizer unit in his house.  Patient states he will try to see if he can obtain " 1 but will uses personal albuterol MDI and may re-contact clinic if albuterol ampules needed.    High concern for viral respiratory illness today    Recommend alternating Tylenol and ibuprofen every 4-6 hours if needed for aches pains fever or chills.  Recommend daily non sedating antihistamine Claritin Zyrtec or loratadine daily over the next 1-2 weeks with Benadryl nightly if needed for severe nasal allergies.  Recommend Sudafed or Coricidin decongestant over the next week if needed for nasal congestion with 3 day limited Afrin or Andrew-Synephrine nasal spray.  Recommend saltwater gargles throat lozenge or Chloraseptic daily if needed for temporary sore throat relief.  Encouraged water and noncarbonated fluids hydration rest over the next 3-5 days.  Cover cough at all times washing sanitized hands and surfaces regularly.  Phenergan DM sparingly lowest dose if needed for severe cough cold congestion body aches and rest.  Do not take sedating medication drive or operate machinery.  Recommend personal albuterol metered-dose inhaler 2 puffs up to 3-4 times daily as needed for cough wheezing shortness for breath or chest tightness.  If in 1-2 days after steroid injection cough congestion body aches and wheezing persists may add oral steroid extension.    Recommend follow-up with primary care physician in 1 week for re-evaluation if not improving.  Recommended emergency department evaluation immediately if respiratory symptoms worsen.  Sore throat  -     SARS Coronavirus 2 Antigen, POCT Manual Read  -     POCT Strep A, Molecular    Acute cough    Wheezing    Other orders  -     dexAMETHasone injection 8 mg  -     promethazine-dextromethorphan (PROMETHAZINE-DM) 6.25-15 mg/5 mL Syrp; Take 5 mLs by mouth every 8 (eight) hours as needed (cough).  Dispense: 118 mL; Refill: 0  -     predniSONE (DELTASONE) 10 MG tablet; Take 1 tablet (10 mg total) by mouth once daily. for 5 days  Dispense: 5 tablet; Refill: 0

## 2024-09-30 NOTE — PATIENT INSTRUCTIONS
High concern for viral respiratory illness today    Recommend alternating Tylenol and ibuprofen every 4-6 hours if needed for aches pains fever or chills.  Recommend daily non sedating antihistamine Claritin Zyrtec or loratadine daily over the next 1-2 weeks with Benadryl nightly if needed for severe nasal allergies.  Recommend Sudafed or Coricidin decongestant over the next week if needed for nasal congestion with 3 day limited Afrin or Andrew-Synephrine nasal spray.  Recommend saltwater gargles throat lozenge or Chloraseptic daily if needed for temporary sore throat relief.  Encouraged water and noncarbonated fluids hydration rest over the next 3-5 days.  Cover cough at all times washing sanitized hands and surfaces regularly.  Phenergan DM sparingly lowest dose if needed for severe cough cold congestion body aches and rest.  Do not take sedating medication drive or operate machinery.  Recommend personal albuterol metered-dose inhaler 2 puffs up to 3-4 times daily as needed for cough wheezing shortness for breath or chest tightness.  If in 1-2 days after steroid injection cough congestion body aches and wheezing persists may add oral steroid extension.    Recommend follow-up with primary care physician in 1 week for re-evaluation if not improving.  Recommended emergency department evaluation immediately if respiratory symptoms worsen.

## 2024-10-01 ENCOUNTER — HOSPITAL ENCOUNTER (EMERGENCY)
Facility: HOSPITAL | Age: 44
Discharge: HOME | End: 2024-10-02
Attending: STUDENT IN AN ORGANIZED HEALTH CARE EDUCATION/TRAINING PROGRAM
Payer: COMMERCIAL

## 2024-10-01 DIAGNOSIS — J45.909 ASTHMA: ICD-10-CM

## 2024-10-01 DIAGNOSIS — R55 SYNCOPE AND COLLAPSE: Primary | ICD-10-CM

## 2024-10-01 DIAGNOSIS — E86.0 DEHYDRATION: ICD-10-CM

## 2024-10-01 DIAGNOSIS — R55 NEAR SYNCOPE: ICD-10-CM

## 2024-10-01 LAB
ALBUMIN SERPL-MCNC: 3.9 G/DL (ref 3.5–5)
ALBUMIN/GLOB SERPL: 1.6 RATIO (ref 1.1–2)
ALP SERPL-CCNC: 49 UNIT/L (ref 40–150)
ALT SERPL-CCNC: 10 UNIT/L (ref 0–55)
ANION GAP SERPL CALC-SCNC: 10 MEQ/L
AST SERPL-CCNC: 12 UNIT/L (ref 5–34)
BASOPHILS # BLD AUTO: 0.06 X10(3)/MCL
BASOPHILS NFR BLD AUTO: 0.5 %
BILIRUB SERPL-MCNC: 0.6 MG/DL
BUN SERPL-MCNC: 19.3 MG/DL (ref 8.9–20.6)
CALCIUM SERPL-MCNC: 9.2 MG/DL (ref 8.4–10.2)
CHLORIDE SERPL-SCNC: 107 MMOL/L (ref 98–107)
CO2 SERPL-SCNC: 24 MMOL/L (ref 22–29)
CREAT SERPL-MCNC: 1.34 MG/DL (ref 0.73–1.18)
CREAT/UREA NIT SERPL: 14
EOSINOPHIL # BLD AUTO: 0.18 X10(3)/MCL (ref 0–0.9)
EOSINOPHIL NFR BLD AUTO: 1.6 %
ERYTHROCYTE [DISTWIDTH] IN BLOOD BY AUTOMATED COUNT: 11.9 % (ref 11.5–17)
FLUAV AG UPPER RESP QL IA.RAPID: NOT DETECTED
FLUBV AG UPPER RESP QL IA.RAPID: NOT DETECTED
GFR SERPLBLD CREATININE-BSD FMLA CKD-EPI: >60 ML/MIN/1.73/M2
GLOBULIN SER-MCNC: 2.5 GM/DL (ref 2.4–3.5)
GLUCOSE SERPL-MCNC: 126 MG/DL (ref 74–100)
HCT VFR BLD AUTO: 39.4 % (ref 42–52)
HGB BLD-MCNC: 13.3 G/DL (ref 14–18)
IMM GRANULOCYTES # BLD AUTO: 0.06 X10(3)/MCL (ref 0–0.04)
IMM GRANULOCYTES NFR BLD AUTO: 0.5 %
LYMPHOCYTES # BLD AUTO: 1.61 X10(3)/MCL (ref 0.6–4.6)
LYMPHOCYTES NFR BLD AUTO: 14.6 %
MAGNESIUM SERPL-MCNC: 2.1 MG/DL (ref 1.6–2.6)
MCH RBC QN AUTO: 30.8 PG (ref 27–31)
MCHC RBC AUTO-ENTMCNC: 33.8 G/DL (ref 33–36)
MCV RBC AUTO: 91.2 FL (ref 80–94)
MONOCYTES # BLD AUTO: 0.72 X10(3)/MCL (ref 0.1–1.3)
MONOCYTES NFR BLD AUTO: 6.5 %
NEUTROPHILS # BLD AUTO: 8.42 X10(3)/MCL (ref 2.1–9.2)
NEUTROPHILS NFR BLD AUTO: 76.3 %
NRBC BLD AUTO-RTO: 0 %
PLATELET # BLD AUTO: 272 X10(3)/MCL (ref 130–400)
PMV BLD AUTO: 11 FL (ref 7.4–10.4)
POCT GLUCOSE: 120 MG/DL (ref 70–110)
POTASSIUM SERPL-SCNC: 3.7 MMOL/L (ref 3.5–5.1)
PROT SERPL-MCNC: 6.4 GM/DL (ref 6.4–8.3)
RBC # BLD AUTO: 4.32 X10(6)/MCL (ref 4.7–6.1)
SARS-COV-2 RNA RESP QL NAA+PROBE: NOT DETECTED
SODIUM SERPL-SCNC: 141 MMOL/L (ref 136–145)
WBC # BLD AUTO: 11.05 X10(3)/MCL (ref 4.5–11.5)

## 2024-10-01 PROCEDURE — 82962 GLUCOSE BLOOD TEST: CPT

## 2024-10-01 PROCEDURE — 25000003 PHARM REV CODE 250: Performed by: PHYSICIAN ASSISTANT

## 2024-10-01 PROCEDURE — 93005 ELECTROCARDIOGRAM TRACING: CPT

## 2024-10-01 PROCEDURE — 93010 ELECTROCARDIOGRAM REPORT: CPT | Mod: ,,, | Performed by: INTERNAL MEDICINE

## 2024-10-01 PROCEDURE — 83735 ASSAY OF MAGNESIUM: CPT | Performed by: PHYSICIAN ASSISTANT

## 2024-10-01 PROCEDURE — 99285 EMERGENCY DEPT VISIT HI MDM: CPT | Mod: 25

## 2024-10-01 PROCEDURE — 96360 HYDRATION IV INFUSION INIT: CPT

## 2024-10-01 PROCEDURE — 84484 ASSAY OF TROPONIN QUANT: CPT | Performed by: STUDENT IN AN ORGANIZED HEALTH CARE EDUCATION/TRAINING PROGRAM

## 2024-10-01 PROCEDURE — 80053 COMPREHEN METABOLIC PANEL: CPT | Performed by: PHYSICIAN ASSISTANT

## 2024-10-01 PROCEDURE — 85025 COMPLETE CBC W/AUTO DIFF WBC: CPT | Performed by: PHYSICIAN ASSISTANT

## 2024-10-01 PROCEDURE — 0240U COVID/FLU A&B PCR: CPT | Performed by: PHYSICIAN ASSISTANT

## 2024-10-01 PROCEDURE — 96361 HYDRATE IV INFUSION ADD-ON: CPT

## 2024-10-01 RX ORDER — SODIUM CHLORIDE 9 MG/ML
1000 INJECTION, SOLUTION INTRAVENOUS
Status: COMPLETED | OUTPATIENT
Start: 2024-10-01 | End: 2024-10-02

## 2024-10-01 RX ADMIN — SODIUM CHLORIDE 1000 ML: 9 INJECTION, SOLUTION INTRAVENOUS at 10:10

## 2024-10-01 NOTE — Clinical Note
"Carla Steinberg (Shannon)y was seen and treated in our emergency department on 10/1/2024.  He may return to work on 10/04/2024.       If you have any questions or concerns, please don't hesitate to call.       RN    "

## 2024-10-02 VITALS
OXYGEN SATURATION: 96 % | RESPIRATION RATE: 23 BRPM | SYSTOLIC BLOOD PRESSURE: 117 MMHG | WEIGHT: 175 LBS | DIASTOLIC BLOOD PRESSURE: 86 MMHG | HEIGHT: 67 IN | HEART RATE: 75 BPM | BODY MASS INDEX: 27.47 KG/M2 | TEMPERATURE: 98 F

## 2024-10-02 LAB
CK SERPL-CCNC: 68 U/L (ref 30–200)
OHS QRS DURATION: 88 MS
OHS QTC CALCULATION: 729 MS
TROPONIN I SERPL-MCNC: <0.01 NG/ML (ref 0–0.04)
TROPONIN I SERPL-MCNC: <0.01 NG/ML (ref 0–0.04)

## 2024-10-02 PROCEDURE — 84484 ASSAY OF TROPONIN QUANT: CPT | Performed by: STUDENT IN AN ORGANIZED HEALTH CARE EDUCATION/TRAINING PROGRAM

## 2024-10-02 PROCEDURE — 82550 ASSAY OF CK (CPK): CPT | Performed by: STUDENT IN AN ORGANIZED HEALTH CARE EDUCATION/TRAINING PROGRAM

## 2024-10-02 NOTE — ED PROVIDER NOTES
Encounter Date: 10/1/2024    SCRIBE #1 NOTE: I, Maria Luisa Torres, am scribing for, and in the presence of,  Luis Daniel Aleman MD. I have scribed the following portions of the note - Other sections scribed: HPI, ROS, PE.       History     Chief Complaint   Patient presents with    Loss of Consciousness     Syncopal episode prior to arrival. 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 Arrives [facility] 15. Denies complaints on arrival.      44 year old male with history of asthma and HTN presents to the ED for syncope. Pt reports that he was at an outdoor event when he became lightheaded and passed out. He states that he felt dehydrated prior to this and notes that he had a cortisone shot a few days ago for an asthma flare up. Pt denies SOB, chest pain, and abdominal pain. He notes that he feels back to normal currently.     The history is provided by the patient. No  was used.     Review of patient's allergies indicates:  No Known Allergies  Past Medical History:   Diagnosis Date    Chronic back pain     HTN (hypertension)     Neck pain      Past Surgical History:   Procedure Laterality Date    CERVICAL FUSION  06/17/2021    anterior    CLOSED REDUCTION AND PERCUTANEOUS PINNING (CRPP) OF WRIST Left 2009    TONSILLECTOMY AND ADENOIDECTOMY      WISDOM TOOTH EXTRACTION  1998     Family History   Problem Relation Name Age of Onset    Hypertension Mother Rachael Arias     Hypertension Father Jez Arias     Gout Father Jez Arias     No Known Problems Brother       Social History     Tobacco Use    Smoking status: Never     Passive exposure: Never    Smokeless tobacco: Never   Substance Use Topics    Alcohol use: Yes    Drug use: Never     Review of Systems   Respiratory:  Negative for shortness of breath.    Cardiovascular:  Negative for chest pain.   Gastrointestinal:  Negative for abdominal pain.   Neurological:  Positive for syncope.       Physical Exam     Initial Vitals [10/01/24 1853]   BP Pulse Resp Temp  SpO2   137/75 85 18 97.7 °F (36.5 °C) 99 %      MAP       --         Physical Exam    Nursing note and vitals reviewed.  Constitutional: He appears well-developed.   Eyes: EOM are normal. Pupils are equal, round, and reactive to light.   Cardiovascular:  Normal rate and regular rhythm.           No murmur heard.  Pulmonary/Chest: Breath sounds normal. No respiratory distress. He has no wheezes. He has no rales.   Abdominal: Abdomen is soft. He exhibits no distension. There is no abdominal tenderness.     Skin: Skin is warm. Capillary refill takes less than 2 seconds. No rash noted.         ED Course   Procedures  Labs Reviewed   COMPREHENSIVE METABOLIC PANEL - Abnormal       Result Value    Sodium 141      Potassium 3.7      Chloride 107      CO2 24      Glucose 126 (*)     Blood Urea Nitrogen 19.3      Creatinine 1.34 (*)     Calcium 9.2      Protein Total 6.4      Albumin 3.9      Globulin 2.5      Albumin/Globulin Ratio 1.6      Bilirubin Total 0.6      ALP 49      ALT 10      AST 12      eGFR >60      Anion Gap 10.0      BUN/Creatinine Ratio 14     CBC WITH DIFFERENTIAL - Abnormal    WBC 11.05      RBC 4.32 (*)     Hgb 13.3 (*)     Hct 39.4 (*)     MCV 91.2      MCH 30.8      MCHC 33.8      RDW 11.9      Platelet 272      MPV 11.0 (*)     Neut % 76.3      Lymph % 14.6      Mono % 6.5      Eos % 1.6      Basophil % 0.5      Lymph # 1.61      Neut # 8.42      Mono # 0.72      Eos # 0.18      Baso # 0.06      IG# 0.06 (*)     IG% 0.5      NRBC% 0.0     POCT GLUCOSE - Abnormal    POCT Glucose 120 (*)    MAGNESIUM - Normal    Magnesium Level 2.10     COVID/FLU A&B PCR - Normal    Influenza A PCR Not Detected      Influenza B PCR Not Detected      SARS-CoV-2 PCR Not Detected      Narrative:     The Xpert Xpress SARS-CoV-2/FLU/RSV plus is a rapid, multiplexed real-time PCR test intended for the simultaneous qualitative detection and differentiation of SARS-CoV-2, Influenza A, Influenza B, and respiratory syncytial  virus (RSV) viral RNA in either nasopharyngeal swab or nasal swab specimens.         TROPONIN I - Normal    Troponin-I <0.010     TROPONIN I - Normal    Troponin-I <0.010     CK - Normal    Creatine Kinase 68     CBC W/ AUTO DIFFERENTIAL    Narrative:     The following orders were created for panel order CBC auto differential.  Procedure                               Abnormality         Status                     ---------                               -----------         ------                     CBC with Differential[[id number]]       Abnormal            Final result                 Please view results for these tests on the individual orders.        ECG Results              EKG 12-lead (Final result)        Collection Time Result Time QRS Duration OHS QTC Calculation    10/01/24 18:56:38 10/02/24 08:16:39 88 729                     Final result by Interface, Lab [facility] (10/02/24 08:16:47)                   Narrative:    Test Reason : R55,    Vent. Rate : 092 BPM     Atrial Rate : 092 BPM     P-R Int : 174 ms          QRS Dur : 088 ms      QT Int : 590 ms       P-R-T Axes : 070 032 069 degrees     QTc Int : 729 ms    Normal sinus rhythm  Prolonged QT  Abnormal ECG  No previous ECGs available  Confirmed by Milton Eason MD ([id number]) on 10/2/2024 8:16:34 AM    Referred By: [email or web address]   SELF           Confirmed By:Milton Eason MD                                     EKG 12-lead (Final result)  Result time 10/07/24 15:03:37      Final result by Unknown User (10/07/24 15:03:37)                                      Imaging Results              X-Ray Chest AP Portable (Final result)  Result time 10/01/24 20:17:31      Final result by Navarro Castellanos MD (10/01/24 20:17:31)                   Narrative:    EXAMINATION  XR CHEST AP PORTABLE    CLINICAL HISTORY  Unspecified asthma, uncomplicated    TECHNIQUE  A total of 1 frontal image(s) of the chest.    COMPARISON  None available at the time of initial  interpretation.    FINDINGS  Lines/tubes/devices: none present    The cardiomediastinal silhouette and central pulmonary vasculature are unremarkable for utilized technique.  The trachea is midline. There is no lobar consolidation, pleural effusion, or pneumothorax.    There is no acute osseous or extrathoracic abnormality.  Lower cervical ACDF hardware incidentally noted.    IMPRESSION  No convincing acute radiographic abnormality.      Electronically signed by: Navarro Castellanos  Date:    10/01/2024  Time:    20:17                                     Medications   0.9%  NaCl infusion (0 mLs Intravenous Stopped 10/2/24 0140)     Medical Decision Making  Problems Addressed:  Asthma: acute illness or injury  Dehydration: acute illness or injury  Near syncope: acute illness or injury    Amount and/or Complexity of Data Reviewed  Radiology:  Decision-making details documented in ED Course.    Differential diagnosis (includes but is not limited to):   ACS, arrhythmia, vasovagal, orthostasis, dehydration, kidney injury, electrolyte abnormalities    MDM Narrative  44-year-old male presents for evaluation after a syncopal episode occurred while he was at a festival earlier today.  Patient states he has had much to eat or drink throughout the day today.  EKG reviewed.  X-ray reviewed.  Labs reviewed.  Mild bump in creatinine noted, likely dehydration related.  Symptoms have resolved after IV fluid resuscitation.  He states he feels at his baseline currently.  He was ambulatory at his baseline with a steady gait.  He was tolerating oral intake.  States he feels ready for discharge home.  Troponin negative x2.  Patient is tolerating oral intake well.  Need for follow up with his PCP discussed with the patient verbalized understanding.  Strict return precautions discussed and patient verbalized understanding.  Patient states he is ready to go home.    Dispo: Discharge    My independent radiology interpretation: as above  Point of  care US (independently performed and interpreted):   Decision rules/clinical scoring:     Sepsis Perfusion Assessment:     Amount and/or Complexity of Data Reviewed  Independent historian: none   Summary of history:   External data reviewed: notes from previous ED visits and notes from clinic visits  Summary of data reviewed: Prior records reviewed  Risk and benefits of testing: discussed   Labs: ordered and reviewed  Radiology: ordered and independent interpretation performed (see above or ED course)  ECG/medicine tests: ordered and independent interpretation performed (see above or ED course)  Discussion of management or test interpretation with external provider(s): none   Summary of discussion:     Risk  OTC medications  Prescription drug management   Shared decision making     Critical Care  none    Data Reviewed/Counseling: I have personally reviewed the patient's vital signs, nursing notes, and other relevant tests, information, and imaging. I had a detailed discussion regarding the historical points, exam findings, and any diagnostic results supporting the discharge diagnosis. I personally performed the history, PE, MDM and procedures as documented above and agree with the scribe's documentation.    Portions of this note were dictated using voice recognition software. Although it was reviewed for accuracy, some inherent voice recognition errors may have occurred and may be present in this document.            Attending Attestation:           Physician Attestation for Scribe:  Physician Attestation Statement for Scribe #1: I, Luis Daniel Aleman MD, reviewed documentation, as scribed by Maria Luisa Torres in my presence, and it is both accurate and complete.             ED Course as of 10/14/24 0626   Wed Oct 02, 2024   0042 X-Ray Chest AP Portable  Independently visualized/reviewed by me during the ED visit.  - No acute lobar consolidation or PTX []   0131 I have reassessed the patient.  Patient is resting  comfortably, no acute distress.  Vital signs stable.  Discussed all results including incidental findings.  Discussed need for follow up and discussed return precautions.  Answered all questions at this time.  Hemodynamically stable for continued outpatient management. Patient verbalized understanding and agreed to plan.    [MC]   0140 EKG independently interpreted by me.  EKG: NSR @ 92, no STEMI, [MC]      ED Course User Index  [] Luis Daniel Aleman MD                           Clinical Impression:  Final diagnoses:  [J45.909] Asthma  [R55] Near syncope  [E86.0] Dehydration          ED Disposition Condition    Discharge Stable          ED Prescriptions    None       Follow-up Information       Follow up With Specialties Details Why Contact Info    Logan Ojeda MD Family Medicine Schedule an appointment as soon as possible for a visit   [address] [facility] [address]  [phone]      [facility]  Emergency Dept Emergency Medicine  If symptoms worsen [address] [facility] [address]  [phone]             Luis Daniel Aleman MD  10/14/24 0626

## 2024-10-02 NOTE — FIRST PROVIDER EVALUATION
"Medical screening examination initiated.  I have conducted a focused provider triage encounter, findings are as follows:    Brief history of present illness:  44yoWM w/HTN presents to the ER with dizziness and weakness with hypotensive episode while at local event outdoors just pta. Brought in via EMS- no fluids given. States weakness and fatigue x 2days w/sinus symptoms- received decadron at  yestserday, feels asthma is worse. Denies nausea,vomiting, abdominal pain, fever, chest pain, chills, or shortness of breath.       Vitals:    10/01/24 1853   BP: 137/75   Pulse: 85   Resp: 18   Temp: 97.7 °F (36.5 °C)   TempSrc: Oral   SpO2: 99%   Weight: 79.4 kg (175 lb)   Height: 5' 7" (1.702 m)       Pertinent physical exam:  NAD.    Brief workup plan:  labs and imaging    Preliminary workup initiated; this workup will be continued and followed by the physician or advanced practice provider that is assigned to the patient when roomed.  "

## 2024-10-02 NOTE — DISCHARGE INSTRUCTIONS

## 2025-01-29 ENCOUNTER — OFFICE VISIT (OUTPATIENT)
Dept: URGENT CARE | Facility: CLINIC | Age: 45
End: 2025-01-29
Payer: COMMERCIAL

## 2025-01-29 VITALS
OXYGEN SATURATION: 97 % | RESPIRATION RATE: 17 BRPM | HEIGHT: 67 IN | TEMPERATURE: 99 F | BODY MASS INDEX: 26.21 KG/M2 | HEART RATE: 80 BPM | DIASTOLIC BLOOD PRESSURE: 86 MMHG | SYSTOLIC BLOOD PRESSURE: 120 MMHG | WEIGHT: 167 LBS

## 2025-01-29 DIAGNOSIS — R05.9 COUGH, UNSPECIFIED TYPE: Primary | ICD-10-CM

## 2025-01-29 DIAGNOSIS — J10.1 INFLUENZA A: ICD-10-CM

## 2025-01-29 LAB
CTP QC/QA: YES
POC MOLECULAR INFLUENZA A AGN: POSITIVE
POC MOLECULAR INFLUENZA B AGN: NEGATIVE

## 2025-01-29 PROCEDURE — 96372 THER/PROPH/DIAG INJ SC/IM: CPT | Mod: ,,, | Performed by: PHYSICIAN ASSISTANT

## 2025-01-29 PROCEDURE — 99213 OFFICE O/P EST LOW 20 MIN: CPT | Mod: 25,,, | Performed by: PHYSICIAN ASSISTANT

## 2025-01-29 PROCEDURE — 87502 INFLUENZA DNA AMP PROBE: CPT | Mod: QW,,, | Performed by: PHYSICIAN ASSISTANT

## 2025-01-29 RX ORDER — PROMETHAZINE HYDROCHLORIDE AND DEXTROMETHORPHAN HYDROBROMIDE 6.25; 15 MG/5ML; MG/5ML
5 SYRUP ORAL EVERY 8 HOURS PRN
Qty: 118 ML | Refills: 0 | Status: SHIPPED | OUTPATIENT
Start: 2025-01-29 | End: 2025-02-03

## 2025-01-29 RX ORDER — OSELTAMIVIR PHOSPHATE 75 MG/1
75 CAPSULE ORAL 2 TIMES DAILY
Qty: 10 CAPSULE | Refills: 0 | Status: SHIPPED | OUTPATIENT
Start: 2025-01-29 | End: 2025-02-03

## 2025-01-29 RX ORDER — BETAMETHASONE SODIUM PHOSPHATE AND BETAMETHASONE ACETATE 3; 3 MG/ML; MG/ML
9 INJECTION, SUSPENSION INTRA-ARTICULAR; INTRALESIONAL; INTRAMUSCULAR; SOFT TISSUE
Status: COMPLETED | OUTPATIENT
Start: 2025-01-29 | End: 2025-01-29

## 2025-01-29 RX ADMIN — BETAMETHASONE SODIUM PHOSPHATE AND BETAMETHASONE ACETATE 9 MG: 3; 3 INJECTION, SUSPENSION INTRA-ARTICULAR; INTRALESIONAL; INTRAMUSCULAR; SOFT TISSUE at 04:01

## 2025-01-29 NOTE — PROGRESS NOTES
"Subjective:      Patient ID: Carla Arias is a 44 y.o. male.    Vitals:  height is 5' 7" (1.702 m) and weight is 75.8 kg (167 lb). His temperature is 98.8 °F (37.1 °C). His blood pressure is 120/86 and his pulse is 80. His respiration is 17 and oxygen saturation is 97%.     Chief Complaint: Fatigue    Male reports in the last 48 hours having acute fever chills body aches fatigue headaches cough and congestion not improved with OTC presents to urgent Care for initial evaluation.  Patient reports last waking attending out of town soccer game though no specific recent known viral sick contacts in the past week.    Fatigue  This is a new problem. Associated symptoms include chills, congestion, coughing, fatigue, a fever and myalgias. Pertinent negatives include no vomiting.       Constitution: Positive for chills, fatigue and fever.   HENT:  Positive for congestion and sinus pressure.    Respiratory:  Positive for cough.    Gastrointestinal:  Negative for vomiting and diarrhea.   Musculoskeletal:  Positive for muscle ache.      Objective:     Physical Exam   Constitutional: He is oriented to person, place, and time. He appears well-developed. He is cooperative.  Non-toxic appearance. He appears ill.      Comments:Awake alert pleasant ambulatory nasally congested male speaks in complete sentences attended by wife     HENT:   Head: Normocephalic.   Ears:   Right Ear: Hearing, tympanic membrane, external ear and ear canal normal. Tympanic membrane is not erythematous and not bulging.   Left Ear: Hearing, tympanic membrane, external ear and ear canal normal. Tympanic membrane is not erythematous and not bulging.   Nose: Mucosal edema and congestion present. No rhinorrhea, purulent discharge or nasal deformity. No epistaxis. Right sinus exhibits no maxillary sinus tenderness and no frontal sinus tenderness. Left sinus exhibits no maxillary sinus tenderness and no frontal sinus tenderness.   Mouth/Throat: Uvula is midline, " oropharynx is clear and moist and mucous membranes are normal. Mucous membranes are moist. No trismus in the jaw. Normal dentition. No uvula swelling. No oropharyngeal exudate, posterior oropharyngeal edema or posterior oropharyngeal erythema.   Eyes: Conjunctivae and lids are normal. No scleral icterus.   Neck: Trachea normal and phonation normal. Neck supple. No edema present. No erythema present. No neck rigidity present.   Cardiovascular: Normal rate, regular rhythm, normal heart sounds and normal pulses.   No murmur heard.Exam reveals no gallop.   Pulmonary/Chest: Effort normal and breath sounds normal. No stridor. No respiratory distress. He has no decreased breath sounds. He has no wheezes. He has no rhonchi. He has no rales.   Musculoskeletal: Normal range of motion.         General: No swelling. Normal range of motion.      Cervical back: He exhibits no tenderness.   Lymphadenopathy:     He has no cervical adenopathy.   Neurological: no focal deficit. He is alert and oriented to person, place, and time. He exhibits normal muscle tone. Coordination normal.   Skin: Skin is warm, dry, intact, not diaphoretic, not pale and no rash.   Psychiatric: His speech is normal and behavior is normal. Judgment and thought content normal.   Nursing note and vitals reviewed.         Previous History      Review of patient's allergies indicates:  No Known Allergies    Past Medical History:   Diagnosis Date    Chronic back pain     HTN (hypertension)     Neck pain      Current Outpatient Medications   Medication Instructions    albuterol (PROAIR HFA) 90 mcg/actuation inhaler 2 puffs, Inhalation, Every 6 hours PRN, Rescue    ALPRAZolam (XANAX) 0.5 mg, Oral, 3 times daily, Take 1-2 tabs 1 hour before flight for anxiety    lisinopriL (PRINIVIL,ZESTRIL) 40 mg, Oral    oseltamivir (TAMIFLU) 75 mg, Oral, 2 times daily    promethazine-dextromethorphan (PROMETHAZINE-DM) 6.25-15 mg/5 mL Syrp 5 mLs, Oral, Every 8 hours PRN     Past  "Surgical History:   Procedure Laterality Date    CERVICAL FUSION  06/17/2021    anterior    CLOSED REDUCTION AND PERCUTANEOUS PINNING (CRPP) OF WRIST Left 2009    TONSILLECTOMY AND ADENOIDECTOMY      WISDOM TOOTH EXTRACTION  1998     Family History   Problem Relation Name Age of Onset    Hypertension Mother Rachael Arias     Hypertension Father Jez Arias     Gout Father Jez Arias     No Known Problems Brother         Social History     Tobacco Use    Smoking status: Never     Passive exposure: Never    Smokeless tobacco: Never   Substance Use Topics    Alcohol use: Yes    Drug use: Never        Physical Exam      Vital Signs Reviewed   /86   Pulse 80   Temp 98.8 °F (37.1 °C)   Resp 17   Ht 5' 7" (1.702 m)   Wt 75.8 kg (167 lb)   SpO2 97%   BMI 26.16 kg/m²        Procedures    Procedures     Labs     Results for orders placed or performed in visit on 01/29/25   POCT Influenza A/B Molecular    Collection Time: 01/29/25  4:27 PM   Result Value Ref Range    POC Molecular Influenza A Ag Positive (A) Negative    POC Molecular Influenza B Ag Negative Negative     Acceptable Yes        Assessment:     1. Cough, unspecified type    2. Influenza A        Plan:   Patient A&O x4 in no respiratory distress in clinic understands positive influenza test results today.  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  Patient ready for discharge now    Positive influenza viral testing today.    May start Tamiflu today to help reduce viral sick time.  Recommend Tylenol every 4-6 hours if needed for aches pains fever or chills.  Encouraged plenty of water and noncarbonated fluids hydration and rest over the next 5-7 days.  Slow to rise and stand to avoid lightheadedness dizziness or syncope.  Cover cough at all times washing sanitized hands and surfaces " regularly.  Recommend daily non sedating antihistamine Claritin loratadine or Zyrtec over the next 1-2 weeks if needed for nasal allergies.  Recommend Sudafed or Coricidin decongestant if needed for nasal sinus congestion pressure and inflammation with 3 day limited Afrin or Andrew-Synephrine nasal spray.  May use prescription cough syrup Phenergan DM if needed for cough congestion and rest.      Recommend contact primary care physician for follow-up in 1-2 weeks for re-evaluation if not improving.  Recommended emergency department evaluation immediately if respiratory symptoms worsen.  Cough, unspecified type  -     POCT Influenza A/B Molecular    Influenza A    Other orders  -     betamethasone acetate-betamethasone sodium phosphate injection 9 mg  -     oseltamivir (TAMIFLU) 75 MG capsule; Take 1 capsule (75 mg total) by mouth 2 (two) times daily. for 5 days  Dispense: 10 capsule; Refill: 0  -     promethazine-dextromethorphan (PROMETHAZINE-DM) 6.25-15 mg/5 mL Syrp; Take 5 mLs by mouth every 8 (eight) hours as needed (cough).  Dispense: 118 mL; Refill: 0

## 2025-01-29 NOTE — PATIENT INSTRUCTIONS
Positive influenza viral testing today.    May start Tamiflu today to help reduce viral sick time.  Recommend Tylenol every 4-6 hours if needed for aches pains fever or chills.  Encouraged plenty of water and noncarbonated fluids hydration and rest over the next 5-7 days.  Slow to rise and stand to avoid lightheadedness dizziness or syncope.  Cover cough at all times washing sanitized hands and surfaces regularly.  Recommend daily non sedating antihistamine Claritin loratadine or Zyrtec over the next 1-2 weeks if needed for nasal allergies.  Recommend Sudafed or Coricidin decongestant if needed for nasal sinus congestion pressure and inflammation with 3 day limited Afrin or Andrew-Synephrine nasal spray.  May use prescription cough syrup Phenergan DM if needed for cough congestion and rest.      Recommend contact primary care physician for follow-up in 1-2 weeks for re-evaluation if not improving.  Recommended emergency department evaluation immediately if respiratory symptoms worsen.